# Patient Record
Sex: MALE
[De-identification: names, ages, dates, MRNs, and addresses within clinical notes are randomized per-mention and may not be internally consistent; named-entity substitution may affect disease eponyms.]

---

## 2018-04-04 PROBLEM — Z00.00 ENCOUNTER FOR PREVENTIVE HEALTH EXAMINATION: Status: ACTIVE | Noted: 2018-04-04

## 2018-04-06 ENCOUNTER — APPOINTMENT (OUTPATIENT)
Dept: MRI IMAGING | Facility: CLINIC | Age: 28
End: 2018-04-06
Payer: COMMERCIAL

## 2018-04-06 ENCOUNTER — OUTPATIENT (OUTPATIENT)
Dept: OUTPATIENT SERVICES | Facility: HOSPITAL | Age: 28
LOS: 1 days | End: 2018-04-06

## 2018-04-06 PROCEDURE — 72148 MRI LUMBAR SPINE W/O DYE: CPT | Mod: 26

## 2023-05-23 ENCOUNTER — APPOINTMENT (OUTPATIENT)
Dept: PHYSICAL MEDICINE AND REHAB | Facility: CLINIC | Age: 33
End: 2023-05-23
Payer: COMMERCIAL

## 2023-05-23 VITALS
SYSTOLIC BLOOD PRESSURE: 127 MMHG | HEIGHT: 72 IN | WEIGHT: 170 LBS | HEART RATE: 72 BPM | BODY MASS INDEX: 23.03 KG/M2 | RESPIRATION RATE: 18 BRPM | DIASTOLIC BLOOD PRESSURE: 76 MMHG

## 2023-05-23 DIAGNOSIS — M25.69 STIFFNESS OF OTHER SPECIFIED JOINT, NOT ELSEWHERE CLASSIFIED: ICD-10-CM

## 2023-05-23 DIAGNOSIS — Z78.9 OTHER SPECIFIED HEALTH STATUS: ICD-10-CM

## 2023-05-23 DIAGNOSIS — M54.9 DORSALGIA, UNSPECIFIED: ICD-10-CM

## 2023-05-23 DIAGNOSIS — Z80.0 FAMILY HISTORY OF MALIGNANT NEOPLASM OF DIGESTIVE ORGANS: ICD-10-CM

## 2023-05-23 DIAGNOSIS — M79.18 MYALGIA, OTHER SITE: ICD-10-CM
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
## 2023-05-23 DIAGNOSIS — M79.9 SOFT TISSUE DISORDER, UNSPECIFIED: ICD-10-CM

## 2023-05-23 DIAGNOSIS — M51.26 OTHER INTERVERTEBRAL DISC DISPLACEMENT, LUMBAR REGION: ICD-10-CM

## 2023-05-23 PROCEDURE — 99204 OFFICE O/P NEW MOD 45 MIN: CPT

## 2023-05-23 NOTE — PHYSICAL EXAM
[FreeTextEntry1] : PHYSICAL EXAM\par \par Gen- NAD, well appearing, pleasant\par HEENT- NCAT, EOMI, MMM\par Pulm- Breathing comfortably on room air\par CV- RRR, warm, well perfused\par GI- No distention, nonTTP\par MSK- Pain at end range of lumbar flexion and extension, however full ROM of L spine. Full ROM of hips. Strength 5/5 in bilateral lower extremities\par Neuro- Sensation intact to LT\par Special tests- Negative SLR bilaterally [de-identified] : mildly stiff  [No Visual Abnormalities] : no visible abnormailities [Normal Lordosis] : normal lordosis [No Scoliosis] : no scoliosis [No Tenderness to Palpation] : no spine tenderness on palpation [No Masses] : no masses [No Pain with ROM] : no pain with motion in any direction [Straight-Leg Raise Test - Left] : straight leg raise of the left leg was negative [Straight-Leg Raise Test - Right] : straight leg raise  of the right leg was negative [] : negative on the right [Normal] : normal [Intact] : all sensory within normal limits bilaterally

## 2023-05-23 NOTE — ASSESSMENT
[FreeTextEntry1] : This is a 34yo male with surgical hx of lumbar discectomy in 2018 presenting with axial low back pain for 2-3 months with no inciting event. Pain is likely \par \par - PT prescription given\par \par PLAN AND RECOMMENDATIONS :\par \par We discussed differential diagnosis and clinical impression\par \par Recommend\par .symptomatic care and support\par  medications NSAIDS as needed -  OTC fine (-personal preference )-(once or twice a day), -warned of  possible GI side effects -advised to take with meals or add over the counter Nexium, if sensitive\par \par  imaging not needed yet \par  referral to [facility] \par  hydrotherapy /heat / cold for pain\par  continue  spinal precautions including care with bending, lifting, twisting and  carrying.\par \par  relative rest and avoidance of painful activity where possible \par  increasing activity as discussed \par  return for follow up 6 weeks \par \par The patient had the opportunity to ask questions and all were answered to their satisfaction. We will coordinate treatment with the other members of the treatment team.\par The patient verbalized understanding of the management/plan rationale and agreed with my recommendations.\par \par

## 2023-05-23 NOTE — REVIEW OF SYSTEMS
[Fever] : no fever [Lower Ext Edema] : no lower extremity edema [Muscle Weakness] : no muscle weakness [Difficulty Walking] : no difficulty walking [Negative] : Heme/Lymph [FreeTextEntry9] : (+) low back pain

## 2023-05-23 NOTE — HISTORY OF PRESENT ILLNESS
[FreeTextEntry1] : Mr. SWATHI STOLL is a very pleasant 33 year male who seen for evaluation of low back pain that has been ongoing for 2-3  without any specific injury or inciting event. The pain is located primarily in his low lumbar paraspinal muscles, is intermittent in nature and described as sharp and achy at times. The pain is rated as 4/10 during today's visit, and ranges from 3-8/10. The patient's symptoms are aggravated by prolonged sitting  and alleviated by stretching and foam rolling . The patient denies any radiating symptoms to the lower extremities, numbness/tingling, weakness, or bowel/bladder dysfunction. Patient lives a very active lifestyle and bikes, skateboards, and plays ultimate [employer]. He has not been as active recently due to his back pain. Of note, he was a former patient and was seen ~5yrs ago for radicular symptoms and underwent a discectomy in 2018 with complete resolution of his symptoms. For his current pain, he takes advil as needed. Has not tried any therapies, acupuncture, or chiropractor. The patient has no other complaints at this time.\par \par